# Patient Record
Sex: FEMALE | Race: WHITE | NOT HISPANIC OR LATINO
[De-identification: names, ages, dates, MRNs, and addresses within clinical notes are randomized per-mention and may not be internally consistent; named-entity substitution may affect disease eponyms.]

---

## 2017-02-01 ENCOUNTER — MED ADMIN CHARGE (OUTPATIENT)
Age: 47
End: 2017-02-01

## 2017-02-01 ENCOUNTER — RX RENEWAL (OUTPATIENT)
Age: 47
End: 2017-02-01

## 2017-11-02 ENCOUNTER — APPOINTMENT (OUTPATIENT)
Dept: POPULATION HEALTH | Facility: CLINIC | Age: 47
End: 2017-11-02
Payer: OTHER MISCELLANEOUS

## 2017-11-02 VITALS
TEMPERATURE: 98 F | SYSTOLIC BLOOD PRESSURE: 100 MMHG | HEART RATE: 85 BPM | BODY MASS INDEX: 26.35 KG/M2 | WEIGHT: 182 LBS | DIASTOLIC BLOOD PRESSURE: 60 MMHG | OXYGEN SATURATION: 98 % | HEIGHT: 69.5 IN

## 2017-11-02 PROCEDURE — 99215 OFFICE O/P EST HI 40 MIN: CPT

## 2018-01-11 ENCOUNTER — APPOINTMENT (OUTPATIENT)
Dept: POPULATION HEALTH | Facility: CLINIC | Age: 48
End: 2018-01-11
Payer: OTHER MISCELLANEOUS

## 2018-01-11 VITALS
DIASTOLIC BLOOD PRESSURE: 70 MMHG | WEIGHT: 178 LBS | SYSTOLIC BLOOD PRESSURE: 98 MMHG | OXYGEN SATURATION: 98 % | BODY MASS INDEX: 25.77 KG/M2 | HEIGHT: 69.5 IN | HEART RATE: 84 BPM | TEMPERATURE: 98.7 F

## 2018-01-11 PROCEDURE — 99354: CPT

## 2018-01-11 PROCEDURE — 99215 OFFICE O/P EST HI 40 MIN: CPT

## 2018-07-12 ENCOUNTER — APPOINTMENT (OUTPATIENT)
Dept: POPULATION HEALTH | Facility: CLINIC | Age: 48
End: 2018-07-12
Payer: OTHER MISCELLANEOUS

## 2018-07-12 VITALS
HEART RATE: 86 BPM | SYSTOLIC BLOOD PRESSURE: 103 MMHG | TEMPERATURE: 98.8 F | DIASTOLIC BLOOD PRESSURE: 67 MMHG | WEIGHT: 165 LBS | OXYGEN SATURATION: 98 % | BODY MASS INDEX: 23.89 KG/M2 | HEIGHT: 69.5 IN

## 2018-07-12 PROCEDURE — 99215 OFFICE O/P EST HI 40 MIN: CPT

## 2018-07-12 PROCEDURE — 99354: CPT

## 2018-07-12 RX ORDER — SERTRALINE HYDROCHLORIDE 100 MG/1
100 TABLET, FILM COATED ORAL
Qty: 60 | Refills: 0 | Status: ACTIVE | COMMUNITY
Start: 2018-06-16

## 2019-01-02 ENCOUNTER — TRANSCRIPTION ENCOUNTER (OUTPATIENT)
Age: 49
End: 2019-01-02

## 2019-01-03 ENCOUNTER — TRANSCRIPTION ENCOUNTER (OUTPATIENT)
Age: 49
End: 2019-01-03

## 2019-02-14 ENCOUNTER — APPOINTMENT (OUTPATIENT)
Dept: POPULATION HEALTH | Facility: CLINIC | Age: 49
End: 2019-02-14
Payer: OTHER MISCELLANEOUS

## 2019-02-14 VITALS
HEIGHT: 70 IN | OXYGEN SATURATION: 99 % | WEIGHT: 170 LBS | SYSTOLIC BLOOD PRESSURE: 106 MMHG | TEMPERATURE: 97.7 F | BODY MASS INDEX: 24.34 KG/M2 | DIASTOLIC BLOOD PRESSURE: 71 MMHG | HEART RATE: 84 BPM

## 2019-02-14 PROCEDURE — 99215 OFFICE O/P EST HI 40 MIN: CPT

## 2019-10-10 ENCOUNTER — APPOINTMENT (OUTPATIENT)
Dept: POPULATION HEALTH | Facility: CLINIC | Age: 49
End: 2019-10-10
Payer: OTHER MISCELLANEOUS

## 2019-10-10 VITALS
DIASTOLIC BLOOD PRESSURE: 80 MMHG | HEART RATE: 81 BPM | WEIGHT: 178 LBS | BODY MASS INDEX: 25.48 KG/M2 | HEIGHT: 70 IN | OXYGEN SATURATION: 97 % | SYSTOLIC BLOOD PRESSURE: 119 MMHG | TEMPERATURE: 98.1 F

## 2019-10-10 PROCEDURE — 99215 OFFICE O/P EST HI 40 MIN: CPT

## 2019-10-10 PROCEDURE — 99354: CPT

## 2020-12-21 ENCOUNTER — TRANSCRIPTION ENCOUNTER (OUTPATIENT)
Age: 50
End: 2020-12-21

## 2020-12-24 ENCOUNTER — APPOINTMENT (OUTPATIENT)
Dept: POPULATION HEALTH | Facility: CLINIC | Age: 50
End: 2020-12-24
Payer: OTHER MISCELLANEOUS

## 2020-12-24 DIAGNOSIS — M53.82 OTHER SPECIFIED DORSOPATHIES, CERVICAL REGION: ICD-10-CM

## 2020-12-24 PROCEDURE — 99443: CPT

## 2020-12-24 NOTE — HISTORY OF PRESENT ILLNESS
[FreeTextEntry1] : We began this TELEHEALTH evaluation at 11:15 am and ended at 12: 50 pm.\par \par Work status: not working, disabled\par Temp impairment: 85%\par \par Ms. Alvarez notes her medications have  for the most ;part and we discussed the use of sertraline with the tramadol and naproxen; she takes only 25 mg of tramadol and usually 3 or 4 times per year for serious breakthrough pain only; also advised she not take the tramadol concurrently with sertraline if possible; also, naproxen and sertaline increasing the risk of GI bleeding, she will avoid taking these on the same day; finally, she has been using the lidoderm patch without any side effects but had once had an injection to her R arm after which she felt very nauseous but it was not deemed to have been a drug reaction, rather the ED did not know what the cause of her nausea after the injection was (either steroid or lidocaine or repivicaine). \par \par We discussed her current symptoms which continue depending on the physical tasks required in her ADLs and she has been managing these with mostly avoidance or spreading out of tasks and the use of medications and topical modalities which she has learned to apply over the years. She notes that at onset of exam now, her R hand feels cold.\par \par Physical Exam\par ABDuction of arms: R: paresthesia in R 5th digit within 15 sec's, on L: pulling along forearm w/o c/o paresthesia\par Wartenberg's sign: ++bilat R > L\par Wrist self-compression visualized on screen: 15 secs: R: c/o numbness in 5th digit; L: no paresthesia reported\par Tinel's test: ant wrist: R: + to 2nd and 3rd digits, L: local tenderness\par Self palpation: R ext forearm: point tenderness at approx 5 cm distal from at epicondyle on R, L: mild tenderness along ext forearm:  R medial epicondyle: point tenderness \par Finkelstein's test: + R > L, w/history of pain on R radial aspect of wrist and base of thumb\par \par Assessment\par Symptomatic; self managing as noted; refer to C-4.2 form for diagnoses\par \par Plan\par 1)  Medications renewed with discussion re: concurrent use and side effects: naproxen, lidoderm patch, diazepam, and tramadol.\par 2)  LTD form completed\par 3)  C-4.2 form to be completed\par 4)  Re-eval in 6 months\par \par

## 2022-04-13 ENCOUNTER — APPOINTMENT (OUTPATIENT)
Dept: POPULATION HEALTH | Facility: CLINIC | Age: 52
End: 2022-04-13
Payer: OTHER MISCELLANEOUS

## 2022-04-13 DIAGNOSIS — G56.31 LESION OF RADIAL NERVE, RIGHT UPPER LIMB: ICD-10-CM

## 2022-04-13 PROCEDURE — 99215 OFFICE O/P EST HI 40 MIN: CPT | Mod: 95

## 2022-04-13 NOTE — PROCEDURE
[FreeTextEntry1] : We began this TELEHEALTH evaluation at 11:30 am (after connectivity problems) and ended at 1:10 pm.\par \par Work status: not working, disabled\par Temp impairment: PPD, 85%\par Last exam: Dec 24, 2020\par \par Ms. Alvarez reports she is taking for pain relief Naproxen 500 mg 2 or 3 times/week, Lidoderm patches, ice and heat topically, valium at night as a muscle relaxant half of a 5 mg pill maybe once per week, Tramadol " a handful of times per year" which she tries to stay away from if the nerve pain becomes intense as opposed to the fatigue and aching pain.  We reviewed her medications for refills and discussed the potential interactions warnings related to their use. \par \par Ms Alvarez reports continued arm pain in her R forearm and elbow ranging from weakness and fatigue and aching which if she doesn't stop the activity will turn to burning pain; usually computer workstation operation triggers this and if she doesn't curtail this she will have to curtail her ADLs.  The flare ups can last up to a week or two; the range is fatigue, aching and then burning pain. Her main problem is to SUSTAIN a hand/arm activity usually writing or computer use, e.g., filling out forms for disability.  Also, due to the pandemic she has not been able to get cleaning help at home.  Cooking tasks: she breaks these up during the day.  Her  will do tasks that she is unable to complete.  She has a hand use "budget".  Her R pectoral region and upper trapezius regions are sore. Her epicondylitis is bilateral. Her R hand finger tips are cold all the time--not recent, but more pronounced currently.  Previously her arm would have to be above her head to trigger this but now if she types a few minutes this occurs. She has gotten a small dog which she walks and is trained though sometimes pulls her which requires more training yet so as not to pull on the leash. \par \par Physical\par Elevation of arms above her head: pt cannot fully extend R arm due to pulling and pain along R pectoral region and along entire length of R arm. In this position, within 5 seconds, pt notes pain in anterior wrist and pulling along entire forearm extensor > flexor aspect, by 30 secs (pt drops arms, c/o pain and fatigue): pt notes arm is completely fatigued with coldness in hand, pain in anterior wrist and numbness in the hand; \par on L arm: no numbness reported but pulling pain noted from the medial elbow region to the 5th digit and fingertips became cold but not as much as the R arm.\par Reverse Phalen's test: provokes same symptoms as above, except not affecting the pectoral and upper trapezius regions and ulnar digits become numb then R middle digit on the R >> L.\par Simple extension of the arms in 90 deg of forward flexion: pt co pulling along extensor > flexor aspects of forearms R >> L starting from elbow regions to the finger tips\par \par Assessment\par Symptomatic; refer to C-4.2 form for diagnoses\par \par Plan\par 1)  LTD [place] form completed\par 2)  Renewed medications for upper limb symptom relief: naproxen, tramadol, diazepam 5 mg, lidoderm patches (specific brand of patches specified due to that brand sticking properly to the skin)\par 3)  Advised continued curtailment of triggering activities as possible and possible resumption of physical therapy discussed\par 4)  C-4.2 form to be completed\par 5)  Re-eval in 2 months for PT referral planned.\par \par

## 2023-06-28 ENCOUNTER — APPOINTMENT (OUTPATIENT)
Dept: POPULATION HEALTH | Facility: CLINIC | Age: 53
End: 2023-06-28
Payer: OTHER MISCELLANEOUS

## 2023-06-28 DIAGNOSIS — G56.01 CARPAL TUNNEL SYNDROME, RIGHT UPPER LIMB: ICD-10-CM

## 2023-06-28 DIAGNOSIS — M77.00 MEDIAL EPICONDYLITIS, UNSPECIFIED ELBOW: ICD-10-CM

## 2023-06-28 DIAGNOSIS — G56.21 LESION OF ULNAR NERVE, RIGHT UPPER LIMB: ICD-10-CM

## 2023-06-28 DIAGNOSIS — M77.8 OTHER ENTHESOPATHIES, NOT ELSEWHERE CLASSIFIED: ICD-10-CM

## 2023-06-28 PROCEDURE — 99215 OFFICE O/P EST HI 40 MIN: CPT | Mod: 95

## 2023-06-28 NOTE — PLAN
[FreeTextEntry1] : Refer to MD note. [FreeTextEntry2] : n/a [FreeTextEntry3] : Permanent disability. [FreeTextEntry4] : 6 - 12 months.

## 2023-06-28 NOTE — PROCEDURE
Name band; [FreeTextEntry1] : We began this TELEHEALTH evaluation at 11:20 am and ended at 12:30 pm.\par \par Work status: not working\par Temp impairment: 85%\par Last exam: April 23, 2022\par \par Ms. Alvarez notes her daily symptoms are incredible soreness in her upper trapezius and pectoral muscles and upper back and chest; also weakness in her hands w/tingling in her R pinky and a low level burning in her R elbow and tightness and fatigue in her R forearm; she is R hand dominant.  Going for a walk and resting her arms and icing or using a heating pad relieves her symptoms; also advil or prescription strength naproxen if needed or valium for muscles spasms; but if the pain is really bad, she will take 25 mg of tramadol which occurs about 4 or 5 times per year if stopping the offending activity has not been effective.  We discussed the hand bank and she always has to plan ahead to spread out activities which impacts every aspect of her ADLs, e.g., "I can cook or use the iPad for a half hour" and this also depends on what she did the day before.  Her L upper arm is not symptomatic, apart from her upper trapezius. She holds her daughter's hand or her dog's leash with her L hand; just making a fist with her R hand provokes pain in her forearm.  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  She will serve on a trial basis for 8 hours per month.  She will not be engaged in any computer work.  She uses the pad on her touchpad to send text and uses her voice for text as well.  This is very sporadic and even so if she exceeds her capacity, she experiences flare ups of her underlying upper extremity symptoms; she noted that putting together her daughter's photo album which would take most people a couple of days has taken her a month since she has to spread out the work to avoid upper limb symptoms.  She can participate in zoom meetings and gets up to break up static posture.  She will not be documenting any of the meeting proceedings. She will sometimes take trips to visit some schools and go to meetings twice a month. 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

## 2023-06-28 NOTE — ASSESSMENT
[Indicate if, in your opinion, the incident that the patient described was the competent medical cause of this injury/illness.] : The incident that the patient described was the competent medical cause of this injury/illness: Yes [Indicate if the patient's complaints are consistent with his/her history of the injury/illness.] : Indicate if the patient's complaints are consistent with his/her history of the injury/illness: Yes [Yes] : Yes, it is consistent [Are there any work limitations? (If so, explain and quantify, including the anticipated duration of the limitations)] : There are work limitations. [Can the patient return to usual work activities as indicated? If yes, indicate date___] : The patient cannot return to usual work activities as indicated. [FreeTextEntry5] : 85 [FreeTextEntry6] : Refer to documents in file.  [FreeTextEntry7] : Permanent limitations on maintaining prolonged static seated posture, repetitive use of R upper limb and hand, and lifting/carrying of significant weight.

## 2023-06-29 ENCOUNTER — TRANSCRIPTION ENCOUNTER (OUTPATIENT)
Age: 53
End: 2023-06-29

## 2024-10-23 ENCOUNTER — APPOINTMENT (OUTPATIENT)
Age: 54
End: 2024-10-23
Payer: OTHER MISCELLANEOUS

## 2024-10-23 DIAGNOSIS — G56.01 CARPAL TUNNEL SYNDROME, RIGHT UPPER LIMB: ICD-10-CM

## 2024-10-23 DIAGNOSIS — M77.00 MEDIAL EPICONDYLITIS, UNSPECIFIED ELBOW: ICD-10-CM

## 2024-10-23 DIAGNOSIS — M77.8 OTHER ENTHESOPATHIES, NOT ELSEWHERE CLASSIFIED: ICD-10-CM

## 2024-10-23 DIAGNOSIS — G56.21 LESION OF ULNAR NERVE, RIGHT UPPER LIMB: ICD-10-CM

## 2024-10-23 DIAGNOSIS — G56.31 LESION OF RADIAL NERVE, RIGHT UPPER LIMB: ICD-10-CM

## 2024-10-23 DIAGNOSIS — M62.9 DISORDER OF MUSCLE, UNSPECIFIED: ICD-10-CM

## 2024-10-23 PROCEDURE — 99215 OFFICE O/P EST HI 40 MIN: CPT

## 2024-11-11 ENCOUNTER — TRANSCRIPTION ENCOUNTER (OUTPATIENT)
Age: 54
End: 2024-11-11

## 2025-04-02 ENCOUNTER — APPOINTMENT (OUTPATIENT)
Age: 55
End: 2025-04-02
Payer: OTHER MISCELLANEOUS

## 2025-04-02 DIAGNOSIS — G56.31 LESION OF RADIAL NERVE, RIGHT UPPER LIMB: ICD-10-CM

## 2025-04-02 DIAGNOSIS — G56.01 CARPAL TUNNEL SYNDROME, RIGHT UPPER LIMB: ICD-10-CM

## 2025-04-02 DIAGNOSIS — G56.21 LESION OF ULNAR NERVE, RIGHT UPPER LIMB: ICD-10-CM

## 2025-04-02 DIAGNOSIS — M62.9 DISORDER OF MUSCLE, UNSPECIFIED: ICD-10-CM

## 2025-04-02 DIAGNOSIS — M77.00 MEDIAL EPICONDYLITIS, UNSPECIFIED ELBOW: ICD-10-CM

## 2025-04-02 PROCEDURE — 99215 OFFICE O/P EST HI 40 MIN: CPT

## 2025-04-03 ENCOUNTER — NON-APPOINTMENT (OUTPATIENT)
Age: 55
End: 2025-04-03